# Patient Record
Sex: FEMALE | Race: WHITE | URBAN - METROPOLITAN AREA
[De-identification: names, ages, dates, MRNs, and addresses within clinical notes are randomized per-mention and may not be internally consistent; named-entity substitution may affect disease eponyms.]

---

## 2017-03-27 ENCOUNTER — OFFICE VISIT (OUTPATIENT)
Dept: FAMILY MEDICINE CLINIC | Age: 57
End: 2017-03-27

## 2017-03-27 VITALS
WEIGHT: 195.2 LBS | RESPIRATION RATE: 16 BRPM | BODY MASS INDEX: 34.59 KG/M2 | HEIGHT: 63 IN | HEART RATE: 91 BPM | OXYGEN SATURATION: 99 % | SYSTOLIC BLOOD PRESSURE: 100 MMHG | TEMPERATURE: 98 F | DIASTOLIC BLOOD PRESSURE: 76 MMHG

## 2017-03-27 DIAGNOSIS — F90.9 ADULT ADHD: ICD-10-CM

## 2017-03-27 DIAGNOSIS — I10 ESSENTIAL HYPERTENSION: Primary | ICD-10-CM

## 2017-03-27 DIAGNOSIS — Z88.9 H/O SEASONAL ALLERGIES: ICD-10-CM

## 2017-03-27 DIAGNOSIS — G47.00 INSOMNIA, UNSPECIFIED TYPE: ICD-10-CM

## 2017-03-27 DIAGNOSIS — N39.0 RECURRENT UTI: ICD-10-CM

## 2017-03-27 RX ORDER — PHENAZOPYRIDINE HYDROCHLORIDE 200 MG/1
1 TABLET, FILM COATED ORAL
Refills: 0 | COMMUNITY
Start: 2016-12-30

## 2017-03-27 RX ORDER — HYDROCHLOROTHIAZIDE 25 MG/1
25 TABLET ORAL DAILY
Refills: 2 | COMMUNITY
Start: 2017-03-21 | End: 2017-03-27 | Stop reason: SDUPTHER

## 2017-03-27 RX ORDER — DEXTROAMPHETAMINE SACCHARATE, AMPHETAMINE ASPARTATE, DEXTROAMPHETAMINE SULFATE AND AMPHETAMINE SULFATE 3.75; 3.75; 3.75; 3.75 MG/1; MG/1; MG/1; MG/1
15 TABLET ORAL 2 TIMES DAILY
Status: CANCELLED | OUTPATIENT
Start: 2017-03-27

## 2017-03-27 RX ORDER — ZOLPIDEM TARTRATE 10 MG/1
10 TABLET ORAL
Qty: 30 TAB | Refills: 0 | Status: CANCELLED | OUTPATIENT
Start: 2017-03-27

## 2017-03-27 RX ORDER — HYDROCODONE BITARTRATE AND ACETAMINOPHEN 7.5; 325 MG/1; MG/1
TABLET ORAL
COMMUNITY
Start: 2017-01-12

## 2017-03-27 RX ORDER — NITROFURANTOIN 25; 75 MG/1; MG/1
100 CAPSULE ORAL AS NEEDED
COMMUNITY
Start: 2017-01-12

## 2017-03-27 RX ORDER — ZOLPIDEM TARTRATE 10 MG/1
10 TABLET ORAL
Status: CANCELLED | OUTPATIENT
Start: 2017-03-27

## 2017-03-27 RX ORDER — ZOLPIDEM TARTRATE 10 MG/1
1 TABLET ORAL
COMMUNITY
Start: 2017-03-20

## 2017-03-27 RX ORDER — HYDROCHLOROTHIAZIDE 25 MG/1
25 TABLET ORAL DAILY
Qty: 30 TAB | Refills: 2 | Status: SHIPPED | OUTPATIENT
Start: 2017-03-27

## 2017-03-27 RX ORDER — FLUTICASONE PROPIONATE 50 MCG
2 SPRAY, SUSPENSION (ML) NASAL DAILY
Qty: 1 BOTTLE | Refills: 0 | Status: SHIPPED | OUTPATIENT
Start: 2017-03-27 | End: 2017-04-24 | Stop reason: SDUPTHER

## 2017-03-27 RX ORDER — LATANOPROST 50 UG/ML
1 SOLUTION/ DROPS OPHTHALMIC
Refills: 1 | COMMUNITY
Start: 2017-03-18

## 2017-03-27 RX ORDER — DEXTROAMPHETAMINE SACCHARATE, AMPHETAMINE ASPARTATE, DEXTROAMPHETAMINE SULFATE AND AMPHETAMINE SULFATE 3.75; 3.75; 3.75; 3.75 MG/1; MG/1; MG/1; MG/1
15 TABLET ORAL 2 TIMES DAILY
COMMUNITY
Start: 2017-01-12

## 2017-03-27 RX ORDER — DEXTROAMPHETAMINE SACCHARATE, AMPHETAMINE ASPARTATE, DEXTROAMPHETAMINE SULFATE AND AMPHETAMINE SULFATE 3.75; 3.75; 3.75; 3.75 MG/1; MG/1; MG/1; MG/1
15 TABLET ORAL 2 TIMES DAILY
Qty: 60 TAB | Refills: 0 | Status: CANCELLED | OUTPATIENT
Start: 2017-03-27

## 2017-03-27 RX ORDER — HYDROCODONE BITARTRATE AND ACETAMINOPHEN 7.5; 325 MG/1; MG/1
TABLET ORAL
Status: CANCELLED | OUTPATIENT
Start: 2017-03-27

## 2017-03-27 RX ORDER — LISINOPRIL 20 MG/1
40 TABLET ORAL DAILY
Qty: 30 TAB | Refills: 2 | Status: SHIPPED | OUTPATIENT
Start: 2017-03-27 | End: 2017-06-10 | Stop reason: SDUPTHER

## 2017-03-27 RX ORDER — FLUTICASONE PROPIONATE 50 MCG
2 SPRAY, SUSPENSION (ML) NASAL DAILY
COMMUNITY
Start: 2012-08-18 | End: 2017-03-27 | Stop reason: SDUPTHER

## 2017-03-27 RX ORDER — LISINOPRIL 20 MG/1
40 TABLET ORAL DAILY
Refills: 2 | COMMUNITY
Start: 2016-12-22 | End: 2017-03-27 | Stop reason: SDUPTHER

## 2017-03-27 NOTE — PROGRESS NOTES
HISTORY OF PRESENT ILLNESS  Jenny Fontaine is a 64 y.o. female. HPI: Here as a new patient to get establish. Moved from [place] in May 2016. Has h/o Adult ADHD, insomnia, recurrent UTI , hypertension, seasonal allergies. On adderall. She has been on it since last 15 years per her. Said she should have been treated with it during childhood but said time was different at that time and parents want their kids to be perfect and it was not done. Now she is doing well on it. Till now even she was moved back to [facility] since may 2016 she was getting refill of medication from prior PCP in [place]. Per her she had ongoing dental work going on at [place] so she was flying every month and she was seeing her prior PCP and getting her control substance medication which included adderall, ambien and norco.   She has insomnia since long time and she is taking [place] for it. Has tried different medication like luntsta, melatonin, trazodone but nothing helped. She denies any depression or anxiety. 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 Per her she has moved to different states so many time. She was on adderall since last 15 years and getting it from prior PCP , and upon asking she said she was in [place] for last 15 years. H/o hypertension. Stable on current medication. Asymptomatic. Shows compliance with medication. No side effects. Visit Vitals    /76 (BP 1 Location: Right arm, BP Patient Position: Sitting)    Pulse 91    Temp 98 °F (36.7 °C) (Oral)    Resp 16    Ht 5' 3\" (1.6 m)    Wt 195 lb 3.2 oz (88.5 kg)    SpO2 99%    BMI 34.58 kg/m2   . Denies any headache, dizziness, no chest pain or trouble breathing, no arm or leg weakness. No nausea or vomiting, no weight or appetite changes, no depression or anxiety. No urine or bowel complains, no palpitation, no diaphoresis. Denies any specific concern today.    During discussion and history i have explained to her that i do not manage any chronic control substance in my office which includes medication for adult ADHD, zolpidem and norco.    Also asked about sleep apnea and she said she snores but no stop breathing during sleep. She never had any evaluation done for that. She was told that she has to see the psychiatrist and need to obtain prescriptions from them. Also zolpidem can be managed by them. Discuss to do control substance contract and briefly explained to her what is in control substance contract before giving them to read. Agree with seeing specialist.   Nurse was advised to give them control substance contract to read and i have checked . Noted conflict in history as she was getting her medications from prior PCP but  was not consistant with it. She had pain medication filled up in July 2016 from physician at [facility], [facility] , then her zolpidem and adderall prescription filled up at [facility] in [address] 2016 for 30 days. She had recently filled up zolpidem prescription from different provider from her prior PCP office on 3/201/6. She was asked about it and she just said she does not count pill. i have told her that she just had filled up that and it does not show consistent with the history you have given it to me. i can not fill up any control substance during this visit and i am happy to order routine preventive lab work and blood pressure medication but she was accompanied by her  and got upset and left the room without any paper work or signing control substance contract. Due to conflict of history information with  and due to inappropriate behavior consider not continue care with the patient. ROS: see HPI     Physical Exam   Constitutional: She is oriented to person, place, and time. No distress. Neck: No thyromegaly present. Cardiovascular: Normal rate, regular rhythm and normal heart sounds. Pulmonary/Chest:   CTA   Abdominal: Soft. Bowel sounds are normal. There is no tenderness. Musculoskeletal: She exhibits no edema. Lymphadenopathy:     She has no cervical adenopathy. Neurological: She is oriented to person, place, and time. Psychiatric: Her behavior is normal.       ASSESSMENT and PLAN    ICD-10-CM ICD-9-CM    1. Essential hypertension: given pain medication refill while having conversations with pt. But will not consider continuation of care further due to conflict in history and  and behavior during visit. I10 401.9 lisinopril (PRINIVIL, ZESTRIL) 20 mg tablet      hydroCHLOROthiazide (HYDRODIURIL) 25 mg tablet   2. Recurrent UTI/ after sexual activity; on antibiotic before sexual activity. Before consider refill she has left the office getting upset. N39.0 599.0    3. Adult ADHD: see HPI  F90.0 314.01 REFERRAL TO PSYCHIATRY   4. Insomnia, unspecified type: see HPI . ordered referral. Will provide info to pt to schedule an appt by letter but will not consider continuation of treatment due to above reason. G47.00 780.52    5.  H/O seasonal allergies Z88.9 V15.09 fluticasone (FLONASE) 50 mcg/actuation nasal spray   Review    Follow-up Disposition: Not on File

## 2017-03-27 NOTE — PROGRESS NOTES
1. Have you been to the ER, urgent care clinic since your last visit? Hospitalized since your last visit? No    2. Have you seen or consulted any other health care providers outside of the [address] [place] [address] since your last visit? Include any pap smears or colon screening. No    Last pap smear - 4/2016 - normal - Dr. Rissa Grey  Last mammogram - 4/2016 - normal; Dr. Rissa Grey. Patient states she has silicon breast implants. Patient declined flu vaccine.

## 2017-04-24 DIAGNOSIS — Z88.9 H/O SEASONAL ALLERGIES: ICD-10-CM

## 2017-04-24 RX ORDER — FLUTICASONE PROPIONATE 50 MCG
SPRAY, SUSPENSION (ML) NASAL
Qty: 1 BOTTLE | Refills: 0 | Status: SHIPPED | OUTPATIENT
Start: 2017-04-24 | End: 2017-05-30 | Stop reason: SDUPTHER

## 2017-05-30 DIAGNOSIS — Z88.9 H/O SEASONAL ALLERGIES: ICD-10-CM

## 2017-05-30 RX ORDER — FLUTICASONE PROPIONATE 50 MCG
SPRAY, SUSPENSION (ML) NASAL
Qty: 1 BOTTLE | Refills: 0 | Status: SHIPPED | OUTPATIENT
Start: 2017-05-30 | End: 2017-07-30 | Stop reason: SDUPTHER

## 2017-07-30 DIAGNOSIS — Z88.9 H/O SEASONAL ALLERGIES: ICD-10-CM

## 2017-07-31 RX ORDER — FLUTICASONE PROPIONATE 50 MCG
SPRAY, SUSPENSION (ML) NASAL
Qty: 1 BOTTLE | Refills: 0 | Status: SHIPPED | OUTPATIENT
Start: 2017-07-31

## 2017-08-17 ENCOUNTER — TELEPHONE (OUTPATIENT)
Dept: FAMILY MEDICINE CLINIC | Age: 57
End: 2017-08-17

## 2017-08-17 NOTE — TELEPHONE ENCOUNTER
Left a voice message asking for return call to [facility], [phone] option [phone]. Need to find out if she had a psychology consult.